# Patient Record
Sex: MALE | Race: WHITE | NOT HISPANIC OR LATINO | URBAN - METROPOLITAN AREA
[De-identification: names, ages, dates, MRNs, and addresses within clinical notes are randomized per-mention and may not be internally consistent; named-entity substitution may affect disease eponyms.]

---

## 2024-08-16 ENCOUNTER — OFFICE VISIT (OUTPATIENT)
Dept: URBAN - METROPOLITAN AREA CLINIC 35 | Facility: CLINIC | Age: 69
End: 2024-08-16
Payer: COMMERCIAL

## 2024-08-16 ENCOUNTER — DASHBOARD ENCOUNTERS (OUTPATIENT)
Age: 69
End: 2024-08-16

## 2024-08-16 ENCOUNTER — LAB OUTSIDE AN ENCOUNTER (OUTPATIENT)
Dept: URBAN - METROPOLITAN AREA CLINIC 35 | Facility: CLINIC | Age: 69
End: 2024-08-16

## 2024-08-16 VITALS
OXYGEN SATURATION: 97 % | DIASTOLIC BLOOD PRESSURE: 77 MMHG | HEIGHT: 71 IN | RESPIRATION RATE: 18 BRPM | HEART RATE: 60 BPM | WEIGHT: 197 LBS | SYSTOLIC BLOOD PRESSURE: 137 MMHG | BODY MASS INDEX: 27.58 KG/M2

## 2024-08-16 DIAGNOSIS — F10.10 ALCOHOL ABUSE: ICD-10-CM

## 2024-08-16 DIAGNOSIS — Z12.11 COLON CANCER SCREENING: ICD-10-CM

## 2024-08-16 DIAGNOSIS — R79.89 ELEVATED LFTS: ICD-10-CM

## 2024-08-16 PROBLEM — 863927004: Status: ACTIVE | Noted: 2024-08-16

## 2024-08-16 PROBLEM — 15167005: Status: ACTIVE | Noted: 2024-08-16

## 2024-08-16 PROBLEM — 305058001: Status: ACTIVE | Noted: 2024-08-16

## 2024-08-16 PROCEDURE — 99204 OFFICE O/P NEW MOD 45 MIN: CPT | Performed by: HOSPITALIST

## 2024-08-16 RX ORDER — NEBIVOLOL 20 MG/1
TABLET ORAL
Qty: 90 TABLET | Status: ACTIVE | COMMUNITY

## 2024-08-16 RX ORDER — BUSPIRONE HYDROCHLORIDE 10 MG/1
TAKE 1 TABLET (10 MG TOTAL) BY MOUTH IN THE MORNING TABLET ORAL
Qty: 90 EACH | Refills: 3 | Status: ACTIVE | COMMUNITY

## 2024-08-16 RX ORDER — LIDOCAINE 50 MG/G
PATCH CUTANEOUS
Qty: 10 PATCH | Status: ACTIVE | COMMUNITY

## 2024-08-16 RX ORDER — HUMAN IMMUNOGLOBULIN G 5 G/50ML
SOLUTION INTRAVENOUS
Qty: 400 MILLILITER | Status: ACTIVE | COMMUNITY

## 2024-08-16 RX ORDER — ROSUVASTATIN CALCIUM 10 MG/1
TABLET, FILM COATED ORAL
Qty: 45 TABLET | Status: ACTIVE | COMMUNITY

## 2024-08-16 RX ORDER — TADALAFIL 5 MG/1
TABLET ORAL
Qty: 90 TABLET | Status: ACTIVE | COMMUNITY

## 2024-08-16 RX ORDER — MIRTAZAPINE 15 MG/1
TAKE 1 TABLET BY MOUTH EVERY DAY AT NIGHT TABLET ORAL
Qty: 90 EACH | Refills: 0 | Status: ACTIVE | COMMUNITY

## 2024-08-16 RX ORDER — MONTELUKAST SODIUM 10 MG/1
TAKE 1 TABLET BY MOUTH EVERY DAY AT NIGHT TABLET, FILM COATED ORAL
Qty: 90 EACH | Refills: 0 | Status: ACTIVE | COMMUNITY

## 2024-08-16 RX ORDER — AMLODIPINE BESYLATE 5 MG/1
TABLET ORAL
Qty: 90 TABLET | Status: ACTIVE | COMMUNITY

## 2024-08-16 RX ORDER — OLMESARTAN MEDOXOMIL 40 MG/1
TAKE 1 TABLET BY MOUTH EVERY DAY IN THE MORNING TABLET ORAL
Qty: 90 EACH | Refills: 0 | Status: ACTIVE | COMMUNITY

## 2024-08-16 NOTE — HPI-TODAY'S VISIT:
69-year-old male with past medical history of hypertension, hyperlipidemia, alcohol abuse presents to the gastroenterology clinic for follow-up of elevated liver function test.   Patient had labs done in August 2024 which showed AST of 42, ALT 39 with normal total bilirubin and alkaline phosphatase.  Platelets 144.  Other labs including hemoglobin, BUN/creatinine within normal limit.  Patient reports significant history of alcohol abuse for past 30 years.  He had significant alcohol use but quit in 2002 and remained sober for almost 13 years but unfortunately he had relapse since 2015.  Since then he had multiple relapses and sobriety and the last alcohol use was in June 2024 for 2 weeks time.  He is currently taking disulfiram and has been sober for the past 45 days.  Due to his significant alcohol use as well as abnormal liver function test he was told to visit gastroenterologist for further evaluation by his PCP.  He denies any previous history of liver cirrhosis or fatty liver disease.  He was told his AST was mildly abnormal in the past but otherwise never had hepatitis infection.  Recent hepatitis serology negative.  Iron panel normal.  Denies any symptoms secondary to liver cirrhosis at this time.  Reports intermittent abdominal cramping for the last 1 day but otherwise denies any other upper or lower GI symptoms.

## 2024-08-23 ENCOUNTER — LAB OUTSIDE AN ENCOUNTER (OUTPATIENT)
Dept: URBAN - METROPOLITAN AREA CLINIC 35 | Facility: CLINIC | Age: 69
End: 2024-08-23

## 2024-08-27 ENCOUNTER — OFFICE VISIT (OUTPATIENT)
Dept: URBAN - METROPOLITAN AREA SURGERY CENTER 8 | Facility: SURGERY CENTER | Age: 69
End: 2024-08-27

## 2024-09-03 ENCOUNTER — LAB OUTSIDE AN ENCOUNTER (OUTPATIENT)
Dept: URBAN - METROPOLITAN AREA CLINIC 35 | Facility: CLINIC | Age: 69
End: 2024-09-03

## 2024-09-03 ENCOUNTER — TELEPHONE ENCOUNTER (OUTPATIENT)
Dept: URBAN - METROPOLITAN AREA CLINIC 35 | Facility: CLINIC | Age: 69
End: 2024-09-03

## 2024-09-03 PROBLEM — 50325005: Status: ACTIVE | Noted: 2024-09-03

## 2024-09-10 ENCOUNTER — OFFICE VISIT (OUTPATIENT)
Dept: URBAN - METROPOLITAN AREA SURGERY CENTER 8 | Facility: SURGERY CENTER | Age: 69
End: 2024-09-10
Payer: MEDICARE

## 2024-09-10 ENCOUNTER — CLAIMS CREATED FROM THE CLAIM WINDOW (OUTPATIENT)
Dept: URBAN - METROPOLITAN AREA CLINIC 4 | Facility: CLINIC | Age: 69
End: 2024-09-10
Payer: MEDICARE

## 2024-09-10 DIAGNOSIS — D12.3 BENIGN NEOPLASM OF TRANSVERSE COLON: ICD-10-CM

## 2024-09-10 DIAGNOSIS — Z80.0 FAMILY HISTORY OF COLON CANCER: ICD-10-CM

## 2024-09-10 DIAGNOSIS — Z12.11 COLON CANCER SCREENING: ICD-10-CM

## 2024-09-10 DIAGNOSIS — K57.30 COLONIC DIVERTICULOSIS: ICD-10-CM

## 2024-09-10 DIAGNOSIS — D12.3 ADENOMA OF TRANSVERSE COLON: ICD-10-CM

## 2024-09-10 PROCEDURE — 00812 ANES LWR INTST SCR COLSC: CPT | Performed by: NURSE ANESTHETIST, CERTIFIED REGISTERED

## 2024-09-10 PROCEDURE — 88305 TISSUE EXAM BY PATHOLOGIST: CPT | Performed by: PATHOLOGY

## 2024-09-10 PROCEDURE — 45385 COLONOSCOPY W/LESION REMOVAL: CPT | Performed by: HOSPITALIST

## 2024-09-10 RX ORDER — ROSUVASTATIN CALCIUM 10 MG/1
TABLET, FILM COATED ORAL
Qty: 45 TABLET | Status: ACTIVE | COMMUNITY

## 2024-09-10 RX ORDER — TADALAFIL 5 MG/1
TABLET ORAL
Qty: 90 TABLET | Status: ACTIVE | COMMUNITY

## 2024-09-10 RX ORDER — LIDOCAINE 50 MG/G
PATCH CUTANEOUS
Qty: 10 PATCH | Status: ACTIVE | COMMUNITY

## 2024-09-10 RX ORDER — MIRTAZAPINE 15 MG/1
TAKE 1 TABLET BY MOUTH EVERY DAY AT NIGHT TABLET ORAL
Qty: 90 EACH | Refills: 0 | Status: ACTIVE | COMMUNITY

## 2024-09-10 RX ORDER — MONTELUKAST SODIUM 10 MG/1
TAKE 1 TABLET BY MOUTH EVERY DAY AT NIGHT TABLET, FILM COATED ORAL
Qty: 90 EACH | Refills: 0 | Status: ACTIVE | COMMUNITY

## 2024-09-10 RX ORDER — OLMESARTAN MEDOXOMIL 40 MG/1
TAKE 1 TABLET BY MOUTH EVERY DAY IN THE MORNING TABLET ORAL
Qty: 90 EACH | Refills: 0 | Status: ACTIVE | COMMUNITY

## 2024-09-10 RX ORDER — HUMAN IMMUNOGLOBULIN G 5 G/50ML
SOLUTION INTRAVENOUS
Qty: 400 MILLILITER | Status: ACTIVE | COMMUNITY

## 2024-09-10 RX ORDER — NEBIVOLOL 20 MG/1
TABLET ORAL
Qty: 90 TABLET | Status: ACTIVE | COMMUNITY

## 2024-09-10 RX ORDER — AMLODIPINE BESYLATE 5 MG/1
TABLET ORAL
Qty: 90 TABLET | Status: ACTIVE | COMMUNITY

## 2024-09-10 RX ORDER — BUSPIRONE HYDROCHLORIDE 10 MG/1
TAKE 1 TABLET (10 MG TOTAL) BY MOUTH IN THE MORNING TABLET ORAL
Qty: 90 EACH | Refills: 3 | Status: ACTIVE | COMMUNITY

## 2024-09-23 ENCOUNTER — TELEPHONE ENCOUNTER (OUTPATIENT)
Dept: URBAN - METROPOLITAN AREA CLINIC 35 | Facility: CLINIC | Age: 69
End: 2024-09-23

## 2024-10-30 ENCOUNTER — OFFICE VISIT (OUTPATIENT)
Dept: URBAN - METROPOLITAN AREA CLINIC 35 | Facility: CLINIC | Age: 69
End: 2024-10-30

## 2024-10-30 NOTE — HPI-TODAY'S VISIT:
Patient present today for a 3 month follow-up.  US Abdomen (08.27.2024) Impression: Hepatomegaly. Hepatic steatosis. Mild gallbladder wall thickening, with trace pericholecystic fluid versus adjacent focal fatty sparing of the liver. See discussion above. These findings are nonspecific. Diffuse mild circumferential gallbladder wall thickening can be seen secondary to hypoproteinemic or hypoalbuminenic or other edematous or inflammatory states. If any clinical concern for cholecystitis or cystic duct patency, consider correlation with nuclear medicine hepatobiliary scan in conjuction with biliary enzymes. 1.7cm indeterminate hypoechoic lesion within the interpolar of the left kidney likely a cyst, with low level internal echoes. This can be further assessed with a contrast enhanced CT or MRI  US Liver Elastography (09.17.2024) Impression: Median liver stiffness value of 3.5kPa. High probability of being normal.  Colonoscopy (09.10.2024) Impression:  -One small (4-6 mm) polyp in the transverse colon, removed with a cold snare. Resected and retrieved. -Diverticulosis in the sigmoid colon. -The examination was otherwise normal on direct and retroflexion views.   Pathology: Colon, Transverse, Polypectomy (Cold Snare): TUBULAR ADENOMA(S).    (Last Visit 08.16.2024) 69-year-old male with past medical history of hypertension, hyperlipidemia, alcohol abuse presents to the gastroenterology clinic for follow-up of elevated liver function test.   Patient had labs done in August 2024 which showed AST of 42, ALT 39 with normal total bilirubin and alkaline phosphatase.  Platelets 144.  Other labs including hemoglobin, BUN/creatinine within normal limit.  Patient reports significant history of alcohol abuse for past 30 years.  He had significant alcohol use but quit in 2002 and remained sober for almost 13 years but unfortunately he had relapse since 2015.  Since then he had multiple relapses and sobriety and the last alcohol use was in June 2024 for 2 weeks time.  He is currently taking disulfiram and has been sober for the past 45 days.  Due to his significant alcohol use as well as abnormal liver function test he was told to visit gastroenterologist for further evaluation by his PCP.  He denies any previous history of liver cirrhosis or fatty liver disease.  He was told his AST was mildly abnormal in the past but otherwise never had hepatitis infection.  Recent hepatitis serology negative.  Iron panel normal.  Denies any symptoms secondary to liver cirrhosis at this time.  Reports intermittent abdominal cramping for the last 1 day but otherwise denies any other upper or lower GI symptoms.

## 2024-11-06 ENCOUNTER — OFFICE VISIT (OUTPATIENT)
Dept: URBAN - METROPOLITAN AREA CLINIC 35 | Facility: CLINIC | Age: 69
End: 2024-11-06
Payer: COMMERCIAL

## 2024-11-06 VITALS
HEIGHT: 71 IN | DIASTOLIC BLOOD PRESSURE: 82 MMHG | BODY MASS INDEX: 27.27 KG/M2 | WEIGHT: 194.8 LBS | HEART RATE: 46 BPM | OXYGEN SATURATION: 97 % | SYSTOLIC BLOOD PRESSURE: 120 MMHG

## 2024-11-06 DIAGNOSIS — D12.6 ADENOMATOUS POLYP OF COLON, UNSPECIFIED PART OF COLON: ICD-10-CM

## 2024-11-06 DIAGNOSIS — F10.10 ALCOHOL ABUSE: ICD-10-CM

## 2024-11-06 DIAGNOSIS — K70.0 ALCOHOLIC FATTY LIVER: ICD-10-CM

## 2024-11-06 PROCEDURE — 99214 OFFICE O/P EST MOD 30 MIN: CPT | Performed by: HOSPITALIST

## 2024-11-06 RX ORDER — OLMESARTAN MEDOXOMIL 40 MG/1
TAKE 1 TABLET BY MOUTH EVERY DAY IN THE MORNING TABLET ORAL
Qty: 90 EACH | Refills: 0 | Status: ACTIVE | COMMUNITY

## 2024-11-06 RX ORDER — MIRTAZAPINE 15 MG/1
TAKE 1 TABLET BY MOUTH EVERY DAY AT NIGHT TABLET ORAL
Qty: 90 EACH | Refills: 0 | Status: ACTIVE | COMMUNITY

## 2024-11-06 RX ORDER — HUMAN IMMUNOGLOBULIN G 5 G/50ML
SOLUTION INTRAVENOUS
Qty: 400 MILLILITER | Status: ACTIVE | COMMUNITY

## 2024-11-06 RX ORDER — ROSUVASTATIN CALCIUM 10 MG/1
TABLET, FILM COATED ORAL
Qty: 45 TABLET | Status: ACTIVE | COMMUNITY

## 2024-11-06 RX ORDER — LIDOCAINE 50 MG/G
PATCH CUTANEOUS
Qty: 10 PATCH | Status: ACTIVE | COMMUNITY

## 2024-11-06 RX ORDER — NEBIVOLOL 20 MG/1
TABLET ORAL
Qty: 90 TABLET | Status: ACTIVE | COMMUNITY

## 2024-11-06 RX ORDER — AMLODIPINE BESYLATE 5 MG/1
TABLET ORAL
Qty: 90 TABLET | Status: ACTIVE | COMMUNITY

## 2024-11-06 RX ORDER — MONTELUKAST SODIUM 10 MG/1
TAKE 1 TABLET BY MOUTH EVERY DAY AT NIGHT TABLET, FILM COATED ORAL
Qty: 90 EACH | Refills: 0 | Status: ACTIVE | COMMUNITY

## 2024-11-06 RX ORDER — BUSPIRONE HYDROCHLORIDE 10 MG/1
TAKE 1 TABLET (10 MG TOTAL) BY MOUTH IN THE MORNING TABLET ORAL
Qty: 90 EACH | Refills: 3 | Status: ACTIVE | COMMUNITY

## 2024-11-06 RX ORDER — TADALAFIL 5 MG/1
TABLET ORAL
Qty: 90 TABLET | Status: ACTIVE | COMMUNITY

## 2024-11-06 NOTE — HPI-TODAY'S VISIT:
Patient present today for a 3 month follow-up.  Interval history -Patient has been doing well since the last clinic visit without any upper or lower GI symptoms. -Patient reports last drink of alcohol was in July 2024 and was able to remain sober since then. -Patient denies any symptoms secondary to high fatty meal and does not appear to have any symptoms secondary to gallstone issues.  US Abdomen (08.27.2024) Impression: Hepatomegaly. Hepatic steatosis. Mild gallbladder wall thickening, with trace pericholecystic fluid versus adjacent focal fatty sparing of the liver. See discussion above. These findings are nonspecific. Diffuse mild circumferential gallbladder wall thickening can be seen secondary to hypoproteinemic or hypoalbuminenic or other edematous or inflammatory states. If any clinical concern for cholecystitis or cystic duct patency, consider correlation with nuclear medicine hepatobiliary scan in conjuction with biliary enzymes. 1.7cm indeterminate hypoechoic lesion within the interpolar of the left kidney likely a cyst, with low level internal echoes. This can be further assessed with a contrast enhanced CT or MRI  US Liver Elastography (09.17.2024) Impression: Median liver stiffness value of 3.5kPa. High probability of being normal.  Colonoscopy (09.10.2024) Impression:  -One small (4-6 mm) polyp in the transverse colon, removed with a cold snare. Resected and retrieved. -Diverticulosis in the sigmoid colon. -The examination was otherwise normal on direct and retroflexion views.   Pathology: Colon, Transverse, Polypectomy (Cold Snare): TUBULAR ADENOMA(S).   (Last Visit 08.16.2024) 69-year-old male with past medical history of hypertension, hyperlipidemia, alcohol abuse presents to the gastroenterology clinic for follow-up of elevated liver function test.  Patient had labs done in August 2024 which showed AST of 42, ALT 39 with normal total bilirubin and alkaline phosphatase.  Platelets 144.  Other labs including hemoglobin, BUN/creatinine within normal limit.  Patient reports significant history of alcohol abuse for past 30 years.  He had significant alcohol use but quit in 2002 and remained sober for almost 13 years but unfortunately he had relapse since 2015.  Since then he had multiple relapses and sobriety and the last alcohol use was in June 2024 for 2 weeks time.  He is currently taking disulfiram and has been sober for the past 45 days.  Due to his significant alcohol use as well as abnormal liver function test he was told to visit gastroenterologist for further evaluation by his PCP.  He denies any previous history of liver cirrhosis or fatty liver disease.  He was told his AST was mildly abnormal in the past but otherwise never had hepatitis infection.  Recent hepatitis serology negative.  Iron panel normal.  Denies any symptoms secondary to liver cirrhosis at this time.  Reports intermittent abdominal cramping for the last 1 day but otherwise denies any other upper or lower GI symptoms.

## 2025-05-09 ENCOUNTER — OFFICE VISIT (OUTPATIENT)
Dept: URBAN - METROPOLITAN AREA CLINIC 35 | Facility: CLINIC | Age: 70
End: 2025-05-09
Payer: COMMERCIAL

## 2025-05-09 DIAGNOSIS — D12.6 ADENOMATOUS POLYP OF COLON, UNSPECIFIED PART OF COLON: ICD-10-CM

## 2025-05-09 DIAGNOSIS — K70.0 ALCOHOLIC FATTY LIVER: ICD-10-CM

## 2025-05-09 PROCEDURE — 99213 OFFICE O/P EST LOW 20 MIN: CPT | Performed by: HOSPITALIST

## 2025-05-09 RX ORDER — MIRTAZAPINE 15 MG/1
TAKE 1 TABLET BY MOUTH EVERY DAY AT NIGHT TABLET ORAL
Qty: 90 EACH | Refills: 0 | Status: ACTIVE | COMMUNITY

## 2025-05-09 RX ORDER — ROSUVASTATIN CALCIUM 10 MG/1
TABLET, FILM COATED ORAL
Qty: 45 TABLET | Status: ACTIVE | COMMUNITY

## 2025-05-09 RX ORDER — AMLODIPINE BESYLATE 5 MG/1
TABLET ORAL
Qty: 90 TABLET | Status: ACTIVE | COMMUNITY

## 2025-05-09 RX ORDER — MONTELUKAST SODIUM 10 MG/1
TAKE 1 TABLET BY MOUTH EVERY DAY AT NIGHT TABLET, FILM COATED ORAL
Qty: 90 EACH | Refills: 0 | Status: ACTIVE | COMMUNITY

## 2025-05-09 RX ORDER — OLMESARTAN MEDOXOMIL 40 MG/1
TAKE 1 TABLET BY MOUTH EVERY DAY IN THE MORNING TABLET ORAL
Qty: 90 EACH | Refills: 0 | Status: ACTIVE | COMMUNITY

## 2025-05-09 RX ORDER — NEBIVOLOL 20 MG/1
TABLET ORAL
Qty: 90 TABLET | Status: ACTIVE | COMMUNITY

## 2025-05-09 RX ORDER — BUSPIRONE HYDROCHLORIDE 10 MG/1
TAKE 1 TABLET (10 MG TOTAL) BY MOUTH IN THE MORNING TABLET ORAL
Qty: 90 EACH | Refills: 3 | Status: ACTIVE | COMMUNITY

## 2025-05-09 RX ORDER — SILODOSIN 8 MG/1
1 CAPSULE WITH A MEAL CAPSULE ORAL ONCE A DAY
Status: ACTIVE | COMMUNITY

## 2025-05-09 RX ORDER — LIDOCAINE 50 MG/G
PATCH CUTANEOUS
Qty: 10 PATCH | Status: ACTIVE | COMMUNITY

## 2025-05-09 RX ORDER — HUMAN IMMUNOGLOBULIN G 5 G/50ML
SOLUTION INTRAVENOUS
Qty: 400 MILLILITER | Status: ACTIVE | COMMUNITY

## 2025-05-09 RX ORDER — TADALAFIL 5 MG/1
TABLET ORAL
Qty: 90 TABLET | Status: ACTIVE | COMMUNITY

## 2025-05-09 NOTE — HPI-TODAY'S VISIT:
Patient present today for a 6 month follow-up for elevated lfts and alcoholic fatty liver.  Interval history - Repeat liver function test in January 2025 was within normal limit.  Patient has remained sober since July 2024 and motivated to remain sober for a long time. - Ultrasound abdomen as well as ultrasound elastography does not show any evidence of advanced liver fibrosis and hepatomegaly with alcoholic fatty liver disease.  Denies any other upper or lower GI symptoms at this time.  Labs(01.16.2025): WBC:5.4, RBC:4.40, Hgb:14.3, Hct:42.6, MCV:97, Platelets:188, BUN:18, Creatinine:0.97, Albumin:4.7, Bilirubin,Total:0.3, Alkaline Phosphatase:78, AST:39, ALT:39, Iron Binding Capacity:328, UIBC:256, Iron:72, Iron Saturation:22, Ferritin:36.  (Last Visit 11.06.2024) Patient present today for a 3 month follow-up.  Interval history -Patient has been doing well since the last clinic visit without any upper or lower GI symptoms. -Patient reports last drink of alcohol was in July 2024 and was able to remain sober since then. -Patient denies any symptoms secondary to high fatty meal and does not appear to have any symptoms secondary to gallstone issues.  US Abdomen (08.27.2024) Impression: Hepatomegaly. Hepatic steatosis. Mild gallbladder wall thickening, with trace pericholecystic fluid versus adjacent focal fatty sparing of the liver. See discussion above. These findings are nonspecific. Diffuse mild circumferential gallbladder wall thickening can be seen secondary to hypoproteinemic or hypoalbuminenic or other edematous or inflammatory states. If any clinical concern for cholecystitis or cystic duct patency, consider correlation with nuclear medicine hepatobiliary scan in conjuction with biliary enzymes. 1.7cm indeterminate hypoechoic lesion within the interpolar of the left kidney likely a cyst, with low level internal echoes. This can be further assessed with a contrast enhanced CT or MRI  US Liver Elastography (09.17.2024) Impression: Median liver stiffness value of 3.5kPa. High probability of being normal.  Colonoscopy (09.10.2024) Impression:  -One small (4-6 mm) polyp in the transverse colon, removed with a cold snare. Resected and retrieved. -Diverticulosis in the sigmoid colon. -The examination was otherwise normal on direct and retroflexion views.   Pathology: Colon, Transverse, Polypectomy (Cold Snare): TUBULAR ADENOMA(S).   (Last Visit 08.16.2024) 69-year-old male with past medical history of hypertension, hyperlipidemia, alcohol abuse presents to the gastroenterology clinic for follow-up of elevated liver function test.  Patient had labs done in August 2024 which showed AST of 42, ALT 39 with normal total bilirubin and alkaline phosphatase.  Platelets 144.  Other labs including hemoglobin, BUN/creatinine within normal limit.  Patient reports significant history of alcohol abuse for past 30 years.  He had significant alcohol use but quit in 2002 and remained sober for almost 13 years but unfortunately he had relapse since 2015.  Since then he had multiple relapses and sobriety and the last alcohol use was in June 2024 for 2 weeks time.  He is currently taking disulfiram and has been sober for the past 45 days.  Due to his significant alcohol use as well as abnormal liver function test he was told to visit gastroenterologist for further evaluation by his PCP.  He denies any previous history of liver cirrhosis or fatty liver disease.  He was told his AST was mildly abnormal in the past but otherwise never had hepatitis infection.  Recent hepatitis serology negative.  Iron panel normal.  Denies any symptoms secondary to liver cirrhosis at this time.  Reports intermittent abdominal cramping for the last 1 day but otherwise denies any other upper or lower GI symptoms.